# Patient Record
Sex: MALE | Race: WHITE | Employment: UNEMPLOYED | URBAN - METROPOLITAN AREA
[De-identification: names, ages, dates, MRNs, and addresses within clinical notes are randomized per-mention and may not be internally consistent; named-entity substitution may affect disease eponyms.]

---

## 2017-08-07 ENCOUNTER — APPOINTMENT (OUTPATIENT)
Dept: GENERAL RADIOLOGY | Age: 27
End: 2017-08-07
Attending: PHYSICIAN ASSISTANT
Payer: SELF-PAY

## 2017-08-07 ENCOUNTER — APPOINTMENT (OUTPATIENT)
Dept: CT IMAGING | Age: 27
End: 2017-08-07
Attending: NURSE PRACTITIONER
Payer: SELF-PAY

## 2017-08-07 ENCOUNTER — HOSPITAL ENCOUNTER (EMERGENCY)
Age: 27
Discharge: ANOTHER HOSPITAL | End: 2017-08-07
Attending: EMERGENCY MEDICINE | Admitting: EMERGENCY MEDICINE
Payer: SELF-PAY

## 2017-08-07 ENCOUNTER — APPOINTMENT (OUTPATIENT)
Dept: MRI IMAGING | Age: 27
End: 2017-08-07
Attending: PHYSICIAN ASSISTANT
Payer: SELF-PAY

## 2017-08-07 VITALS
HEIGHT: 61 IN | TEMPERATURE: 98.6 F | DIASTOLIC BLOOD PRESSURE: 96 MMHG | HEART RATE: 64 BPM | BODY MASS INDEX: 34.93 KG/M2 | OXYGEN SATURATION: 97 % | SYSTOLIC BLOOD PRESSURE: 112 MMHG | RESPIRATION RATE: 16 BRPM | WEIGHT: 185 LBS

## 2017-08-07 DIAGNOSIS — Q05.5: Primary | ICD-10-CM

## 2017-08-07 LAB
ANION GAP BLD CALC-SCNC: 7 MMOL/L (ref 5–15)
APPEARANCE UR: CLEAR
BACTERIA URNS QL MICRO: NEGATIVE /HPF
BASOPHILS # BLD AUTO: 0 K/UL (ref 0–0.1)
BASOPHILS # BLD: 1 % (ref 0–1)
BILIRUB UR QL: NEGATIVE
BUN SERPL-MCNC: 11 MG/DL (ref 6–20)
BUN/CREAT SERPL: 18 (ref 12–20)
CALCIUM SERPL-MCNC: 9.6 MG/DL (ref 8.5–10.1)
CHLORIDE SERPL-SCNC: 103 MMOL/L (ref 97–108)
CO2 SERPL-SCNC: 28 MMOL/L (ref 21–32)
COLOR UR: NORMAL
CREAT SERPL-MCNC: 0.61 MG/DL (ref 0.7–1.3)
EOSINOPHIL # BLD: 0.1 K/UL (ref 0–0.4)
EOSINOPHIL NFR BLD: 2 % (ref 0–7)
EPITH CASTS URNS QL MICRO: NORMAL /LPF
ERYTHROCYTE [DISTWIDTH] IN BLOOD BY AUTOMATED COUNT: 12.8 % (ref 11.5–14.5)
GLUCOSE SERPL-MCNC: 97 MG/DL (ref 65–100)
GLUCOSE UR STRIP.AUTO-MCNC: NEGATIVE MG/DL
HCT VFR BLD AUTO: 43.4 % (ref 36.6–50.3)
HGB BLD-MCNC: 15.1 G/DL (ref 12.1–17)
HGB UR QL STRIP: NEGATIVE
HYALINE CASTS URNS QL MICRO: NORMAL /LPF (ref 0–5)
KETONES UR QL STRIP.AUTO: NEGATIVE MG/DL
LEUKOCYTE ESTERASE UR QL STRIP.AUTO: NEGATIVE
LYMPHOCYTES # BLD AUTO: 28 % (ref 12–49)
LYMPHOCYTES # BLD: 2 K/UL (ref 0.8–3.5)
MAGNESIUM SERPL-MCNC: 1.9 MG/DL (ref 1.6–2.4)
MCH RBC QN AUTO: 31.2 PG (ref 26–34)
MCHC RBC AUTO-ENTMCNC: 34.8 G/DL (ref 30–36.5)
MCV RBC AUTO: 89.7 FL (ref 80–99)
MONOCYTES # BLD: 0.6 K/UL (ref 0–1)
MONOCYTES NFR BLD AUTO: 9 % (ref 5–13)
NEUTS SEG # BLD: 4.3 K/UL (ref 1.8–8)
NEUTS SEG NFR BLD AUTO: 60 % (ref 32–75)
NITRITE UR QL STRIP.AUTO: NEGATIVE
PH UR STRIP: 7 [PH] (ref 5–8)
PLATELET # BLD AUTO: 198 K/UL (ref 150–400)
POTASSIUM SERPL-SCNC: 5.3 MMOL/L (ref 3.5–5.1)
PROT UR STRIP-MCNC: NEGATIVE MG/DL
RBC # BLD AUTO: 4.84 M/UL (ref 4.1–5.7)
RBC #/AREA URNS HPF: NORMAL /HPF (ref 0–5)
SODIUM SERPL-SCNC: 138 MMOL/L (ref 136–145)
SP GR UR REFRACTOMETRY: 1.01 (ref 1–1.03)
UROBILINOGEN UR QL STRIP.AUTO: 0.2 EU/DL (ref 0.2–1)
WBC # BLD AUTO: 7.1 K/UL (ref 4.1–11.1)
WBC URNS QL MICRO: NORMAL /HPF (ref 0–4)

## 2017-08-07 PROCEDURE — 74011250636 HC RX REV CODE- 250/636: Performed by: EMERGENCY MEDICINE

## 2017-08-07 PROCEDURE — 72141 MRI NECK SPINE W/O DYE: CPT

## 2017-08-07 PROCEDURE — 96374 THER/PROPH/DIAG INJ IV PUSH: CPT

## 2017-08-07 PROCEDURE — 72146 MRI CHEST SPINE W/O DYE: CPT

## 2017-08-07 PROCEDURE — 72125 CT NECK SPINE W/O DYE: CPT

## 2017-08-07 PROCEDURE — 72072 X-RAY EXAM THORAC SPINE 3VWS: CPT

## 2017-08-07 PROCEDURE — 93971 EXTREMITY STUDY: CPT

## 2017-08-07 PROCEDURE — 81001 URINALYSIS AUTO W/SCOPE: CPT | Performed by: NURSE PRACTITIONER

## 2017-08-07 PROCEDURE — 99285 EMERGENCY DEPT VISIT HI MDM: CPT

## 2017-08-07 PROCEDURE — 70450 CT HEAD/BRAIN W/O DYE: CPT

## 2017-08-07 PROCEDURE — 85025 COMPLETE CBC W/AUTO DIFF WBC: CPT | Performed by: NURSE PRACTITIONER

## 2017-08-07 PROCEDURE — 83735 ASSAY OF MAGNESIUM: CPT | Performed by: NURSE PRACTITIONER

## 2017-08-07 PROCEDURE — 74011250637 HC RX REV CODE- 250/637: Performed by: NURSE PRACTITIONER

## 2017-08-07 PROCEDURE — 80048 BASIC METABOLIC PNL TOTAL CA: CPT | Performed by: NURSE PRACTITIONER

## 2017-08-07 PROCEDURE — 36415 COLL VENOUS BLD VENIPUNCTURE: CPT | Performed by: NURSE PRACTITIONER

## 2017-08-07 RX ORDER — OXYCODONE HYDROCHLORIDE 5 MG/1
5 TABLET ORAL
Status: COMPLETED | OUTPATIENT
Start: 2017-08-07 | End: 2017-08-07

## 2017-08-07 RX ORDER — HYDROMORPHONE HYDROCHLORIDE 1 MG/ML
1 INJECTION, SOLUTION INTRAMUSCULAR; INTRAVENOUS; SUBCUTANEOUS ONCE
Status: COMPLETED | OUTPATIENT
Start: 2017-08-07 | End: 2017-08-07

## 2017-08-07 RX ORDER — IBUPROFEN 200 MG
1 TABLET ORAL ONCE
Status: DISCONTINUED | OUTPATIENT
Start: 2017-08-07 | End: 2017-08-08 | Stop reason: HOSPADM

## 2017-08-07 RX ADMIN — HYDROMORPHONE HYDROCHLORIDE 1 MG: 1 INJECTION, SOLUTION INTRAMUSCULAR; INTRAVENOUS; SUBCUTANEOUS at 19:45

## 2017-08-07 RX ADMIN — OXYCODONE HYDROCHLORIDE 5 MG: 5 TABLET ORAL at 17:21

## 2017-08-07 NOTE — CONSULTS
ORTHOPEDIC  CONSULT    Subjective:     Date of Consultation:  2017    Referring Physician:  Melania Perdomo NP    Kailyn Guardado is a 32 y.o. male who is being seen for increasing pain, numbness, and swelling in his right arm and right leg. He is an otherwise healthy patient that presented to the [facility] complaining of a 4 month history of increasing weakness and numbness in his arms and legs. This has progressively gotten worse without injury. The patient states he has not had an injury,but states he has had a workup in the past for a meningocele. He does not remember when or what images he has had done in the past.  He states he went to [place] [facility] about a month ago and was diagnosed with a muscle strain. He denies fever, chills, nausea, or vomiting. There are no active problems to display for this patient. No family history on file. Social History   Substance Use Topics    Smoking status: Not on file    Smokeless tobacco: Not on file    Alcohol use Not on file     No past medical history on file. No past surgical history on file. Prior to Admission medications    Not on File     Current Facility-Administered Medications   Medication Dose Route Frequency    nicotine (NICODERM CQ) 21 mg/24 hr patch 1 Patch  1 Patch TransDERmal ONCE     No current outpatient prescriptions on file. Allergies   Allergen Reactions    Morphine Nausea and Vomiting        Review of Systems:  Pertinent items are noted in HPI. Objective:     Patient Vitals for the past 8 hrs:   BP Temp Pulse Resp SpO2 Height Weight   17 1722 - - - - 95 % - -   17 1721 135/72 - - - - - -   17 1404 134/70 - 64 16 95 % - -   17 1000 117/70 98.6 °F (37 °C) 71 18 97 % 5' 1\" (1.549 m) 83.9 kg (185 lb)     Temp (24hrs), Av.6 °F (37 °C), Min:98.6 °F (37 °C), Max:98.6 °F (37 °C)        EXAM: GEN: Well developed and well nurtured.     PSYCH:  AAO x 3  MUSC: There is no erythema or ecchymosis of his bilateral upper extremities. He has diffuse edema of his right upper extremity from finger to mid forearm. He has decreased  strength on the right- 4/5. Right bicep is 4/5. Right tricep is 4/5. DTR of biceps and triceps is hyperreflexic on the right and normal 2+ on the left. He has hyperkyphosis of his cervical and cervical thoracic junction. He has obvious muscle wasting of the right scapula. He has obvious scoliotic curve from thoracic spine to cervical spine. There is loss of normal lordosis of lumber spine. Right leg has mild edema. There is weakness with PF and DF of right foot. The right PF is 4/5. DTR of achilles is hyporeflexic on the right and normal on the left. FINDINGS:     There is straightening of the normal cervical lordosis with complex segmentation  anomaly at the cervical thoracic junction. Numbering of the lower cervical and  upper thoracic spine is extremely difficult. There is a defect in the anterior  vertebral column beginning at approximately T5 inferior endplate. Through this  defect there is a large complex cystic lesion which has both components within  the canal and anterior to the vertebral body. The component within the canal  measures 4.7 x 2.3 x 2.9 cm. This extends anterior to the upper thoracic  vertebral body into the anterior mediastinum to the right and this portion  measures 5.4 x 4.5 x 1.9 cm. There are septations within this collection. This  lesion differs in signal from the adjacent CSF being isointense on T1 and  hyperintense on the T2 and STIR images and may contain proteinaceous material.  This results in severe compression of the cord beginning at the mid body of C4  through the inferior margin of the mass lesion and bony defect within the canal.  There is focal syrinx within the cord and slightly extending into the upper  thoracic spine.  The cord in the remainder spine has a small syrinx/prominence of  the central medullary canal measuring 1.6 mm. As numbering of the thoracic spine  is challenging exact termination of the cord is difficult to determine however  it does not appear to be low-lying.     There is no significant degenerative change of the cervical spine.     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 There is a complex cystic lesion  (may represent a narrowing characteristic cyst) with both epidural and  extraspinal components. This results in severe compression of the cord extending  from approximately C4 through the inferior margin of the bony defect with  associated syrinx    FINDINGS:     There is straightening of the normal cervical lordosis with complex segmentation  anomaly at the cervical thoracic junction. Numbering of the lower cervical and  upper thoracic spine is extremely difficult. There is a defect in the anterior  vertebral column beginning at approximately T5 inferior endplate. Through this  defect there is a large complex cystic lesion which has both components within  the canal and anterior to the vertebral body. The component within the canal  measures 4.7 x 2.3 x 2.9 cm. This extends anterior to the upper thoracic  vertebral body into the anterior mediastinum to the right and this portion  measures 5.4 x 4.5 x 1.9 cm. There are septations within this collection. This  lesion differs in signal from the adjacent CSF being isointense on T1 and  hyperintense on the T2 and STIR images and may contain proteinaceous material.  This results in severe compression of the cord beginning at the mid body of C4  through the inferior margin of the mass lesion and bony defect within the canal.  There is focal syrinx within the cord and slightly extending into the upper  thoracic spine. The cord in the remainder spine has a small syrinx/prominence of  the central medullary canal measuring 1.6 mm.  As numbering of the thoracic spine  is challenging exact termination of the cord is difficult to determine however  it does not appear to be low-lying.     There is no significant degenerative change of the cervical spine.     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 There is a complex cystic lesion  (may represent a narrowing characteristic cyst) with both epidural and  extraspinal components. This results in severe compression of the cord extending  from approximately C4 through the inferior margin of the bony defect with  associated syrinx    Data Review   Recent Results (from the past 24 hour(s))   CBC WITH AUTOMATED DIFF    Collection Time: 08/07/17 11:11 AM   Result Value Ref Range    WBC 7.1 4.1 - 11.1 K/uL    RBC 4.84 4.10 - 5.70 M/uL    HGB 15.1 12.1 - 17.0 g/dL    HCT 43.4 36.6 - 50.3 %    MCV 89.7 80.0 - 99.0 FL    MCH 31.2 26.0 - 34.0 PG    MCHC 34.8 30.0 - 36.5 g/dL    RDW 12.8 11.5 - 14.5 %    PLATELET 447 423 - 178 K/uL    NEUTROPHILS 60 32 - 75 %    LYMPHOCYTES 28 12 - 49 %    MONOCYTES 9 5 - 13 %    EOSINOPHILS 2 0 - 7 %    BASOPHILS 1 0 - 1 %    ABS. NEUTROPHILS 4.3 1.8 - 8.0 K/UL    ABS. LYMPHOCYTES 2.0 0.8 - 3.5 K/UL    ABS. MONOCYTES 0.6 0.0 - 1.0 K/UL    ABS. EOSINOPHILS 0.1 0.0 - 0.4 K/UL    ABS.  BASOPHILS 0.0 0.0 - 0.1 K/UL   METABOLIC PANEL, BASIC    Collection Time: 08/07/17 11:11 AM   Result Value Ref Range    Sodium 138 136 - 145 mmol/L    Potassium 5.3 (H) 3.5 - 5.1 mmol/L    Chloride 103 97 - 108 mmol/L    CO2 28 21 - 32 mmol/L    Anion gap 7 5 - 15 mmol/L    Glucose 97 65 - 100 mg/dL    BUN 11 6 - 20 MG/DL    Creatinine 0.61 (L) 0.70 - 1.30 MG/DL    BUN/Creatinine ratio 18 12 - 20      GFR est AA >60 >60 ml/min/1.73m2    GFR est non-AA >60 >60 ml/min/1.73m2    Calcium 9.6 8.5 - 10.1 MG/DL   MAGNESIUM    Collection Time: 08/07/17 11:11 AM   Result Value Ref Range    Magnesium 1.9 1.6 - 2.4 mg/dL URINALYSIS W/MICROSCOPIC    Collection Time: 08/07/17 11:47 AM   Result Value Ref Range    Color YELLOW/STRAW      Appearance CLEAR CLEAR      Specific gravity 1.011 1.003 - 1.030      pH (UA) 7.0 5.0 - 8.0      Protein NEGATIVE  NEG mg/dL    Glucose NEGATIVE  NEG mg/dL    Ketone NEGATIVE  NEG mg/dL    Bilirubin NEGATIVE  NEG      Blood NEGATIVE  NEG      Urobilinogen 0.2 0.2 - 1.0 EU/dL    Nitrites NEGATIVE  NEG      Leukocyte Esterase NEGATIVE  NEG      WBC 0-4 0 - 4 /hpf    RBC 0-5 0 - 5 /hpf    Epithelial cells FEW FEW /lpf    Bacteria NEGATIVE  NEG /hpf    Hyaline cast 0-2 0 - 5 /lpf         Assessment/Plan:     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 Marylen Highman about the patient who had their spine surgeon review the films as well. They both agree this patient needs to be transferred to [facility] neurosurgical unit for definitive management. We will set up this transfer tonight. He will be placed in a cervical hard collar to be transferred. The above was discussed with the patient in detail and he understands the severity of this case. He refused hard cervical collar for transport. Discussed case with Dr. Marylen Highman who agrees with plan. Fran Goldmann, PA   [facility]  [address] [place] [address]  Pgr.  [phone]

## 2017-08-07 NOTE — ED NOTES
Entered pt room to apply Nicotine patch to find pt not in room, significant other not in room, and no belongings in room.

## 2017-08-07 NOTE — ED TRIAGE NOTES
Pt states having neck pain, spasms x 4 months, right side pain that radiates down to wrist of right arm. Numbness to left side of neck and left arm and leg.

## 2017-08-07 NOTE — PROCEDURES
[facility]  *** FINAL REPORT ***    Name: Carolina Núñez  MRN: [id number]    Outpatient  : 1990  HIS Order #: [id number]  [address] [facility] Visit #: [id number]  Date: 07 Aug 2017    TYPE OF TEST: Peripheral Venous Testing    REASON FOR TEST  Pain in limb    Right Leg:-  Deep venous thrombosis:           No  Superficial venous thrombosis:    No  Deep venous insufficiency:        No  Superficial venous insufficiency: No      INTERPRETATION/FINDINGS  PROCEDURE:  RIGHT LOWER EXTREMITY  VENOUS DUPLEX. Evaluation of lower  extremity veins with ultrasound (B-mode imaging, pulsed Doppler, color   Doppler). Includes the common femoral, deep femoral, femoral,  popliteal, posterior tibial, peroneal, and great saphenous veins. Other veins, for example the gastrocnemius and soleal veins, may also  be visualized. FINDINGS: Salas Arun scale and color flow duplex images of the veins in the  right lower extremity demonstrate normal compressibility, spontaneous  and augmented flow profiles, and absence of filling defects throughout   the deep and superficial veins in the right lower extremity. CONCLUSION: Right lower extremity venous duplex negative for deep  venous thrombosis or thrombophlebitis. Left common femoral vein is  thrombus free. ADDITIONAL COMMENTS    I have personally reviewed the data relevant to the interpretation of  this  study. TECHNOLOGIST: Terrence Franco RDCS  Signed: 2017 10:54 AM    PHYSICIAN: Ahmet Alvarez.  Jake Rogers MD  Signed: 2017 07:41 AM

## 2017-08-07 NOTE — ED PROVIDER NOTES
HPI Comments: Rashad Sloan is a 32 y.o. male who presents ambulatory with his significant other to the ED with  c/o right hand numbness and tingling, right calf pain, right thigh numbness and left arm and leg numbness and tingling. Patient states these symptoms started about four months ago and have gotten increasingly worse. Patient states he went to PCP along with [facility] and was given anti- inflammatory medicine only and was told\" it would improve. \" Patient presents here today because he said \"it hasn't improved and the numbness and tingling are getting worse. \" Patient states pain 5/10 currently in neck. PCP: Modesto Dennis MD    PMHx significant for: No past medical history on file. PSHx significant for: No past surgical history on file. Social Hx: Tobacco: current everyday user EtOH: none Illicit drug use: none    There are no further complaints or symptoms at this time. The history is provided by the patient and a significant other. No past medical history on file. No past surgical history on file. No family history on file. Social History     Social History    Marital status: LEGALLY      Spouse name: N/A    Number of children: N/A    Years of education: N/A     Occupational History    Not on file. Social History Main Topics    Smoking status: Not on file    Smokeless tobacco: Not on file    Alcohol use Not on file    Drug use: Not on file    Sexual activity: Not on file     Other Topics Concern    Not on file     Social History Narrative         ALLERGIES: Morphine    Review of Systems   Constitutional: Positive for activity change (states he has been sedentary due to weakness, numbness and tingling). Negative for appetite change, chills, diaphoresis, fatigue and fever. HENT: Negative for congestion, ear pain, sinus pressure, sore throat, trouble swallowing and voice change.     Eyes: Negative for photophobia, pain, redness and visual disturbance. Respiratory: Negative for chest tightness, shortness of breath and wheezing. Cardiovascular: Negative for chest pain and palpitations. Gastrointestinal: Negative for abdominal distention, abdominal pain, nausea and vomiting. Endocrine: Negative. Genitourinary: Negative for difficulty urinating, flank pain, frequency and urgency. Musculoskeletal: Positive for neck pain (cervical spine tenderness on palpation) and neck stiffness (states neck stiff for about four months). Negative for gait problem and joint swelling. Skin: Negative for color change, pallor, rash and wound. Allergic/Immunologic: Negative. Neurological: Positive for weakness (right hand weakness noted, left leg weakness) and numbness (to bilateral upper and lower extremities). Negative for dizziness, syncope, speech difficulty and headaches. Hematological: Does not bruise/bleed easily. Psychiatric/Behavioral: Negative for behavioral problems. The patient is not nervous/anxious. Vitals:    08/07/17 1000 08/07/17 1404   BP: 117/70 134/70   Pulse: 71 64   Resp: 18 16   Temp: 98.6 °F (37 °C)    SpO2: 97% 95%   Weight: 83.9 kg (185 lb)    Height: 5' 1\" (1.549 m)             Physical Exam   Constitutional: He is oriented to person, place, and time. He appears well-developed and well-nourished. He appears distressed (moderate distress). HENT:   Head: Normocephalic and atraumatic. Right Ear: External ear normal.   Left Ear: External ear normal.   Nose: Nose normal.   Mouth/Throat: Oropharynx is clear and moist.   Eyes: Conjunctivae and EOM are normal. Pupils are equal, round, and reactive to light. Right eye exhibits no discharge. Left eye exhibits no discharge. Neck: Neck supple. No JVD present. No tracheal deviation present. Unable to perform full range of motion. Patient states he can not range to the left, minimally to right.  Flexion and extension minimal.  Refuses cervical collar   Cardiovascular: Normal rate, regular rhythm, normal heart sounds and intact distal pulses. Exam reveals no gallop. No murmur heard. Pulmonary/Chest: Effort normal and breath sounds normal. No respiratory distress. He has no wheezes. He has no rales. He exhibits no tenderness. Abdominal: Soft. Bowel sounds are normal. He exhibits no distension. There is no tenderness. There is no rebound and no guarding. Genitourinary:   Genitourinary Comments: Negative; positive sensation to both bowel and bladder   Musculoskeletal: He exhibits no edema or tenderness. C/o weakness to right hand and left leg. Lymphadenopathy:     He has no cervical adenopathy. Neurological: He is alert and oriented to person, place, and time. c/o right hand numbness and tingling, right calf pain, right thigh numbness and left arm and leg numbness and tingling. Skin: Skin is warm and dry. No rash noted. No erythema. No pallor. Psychiatric: He has a normal mood and affect. His behavior is normal. Judgment and thought content normal.   Nursing note and vitals reviewed. MDM  Number of Diagnoses or Management Options  Cervical spinal meningocele [facility]): new and requires workup  Diagnosis management comments: Plan:  Transfer to [facility] Department of Neurosurgery  Dr. Rachel Higgins accepted patient under Nsgy service    ED Course     1450: Orthopedics at bedside to see patient. For MRI cervical and thoracic spine today. 1515: AT MRI for films. 1551: re assessment and discussion with patient regarding the plan of care. Diet ordered  1615: To MRI for cervical and thoracic scans  1700: reviewed MRI with Loli Thomaser and Radiologist Dr. Monique Gao. Probable transfer to [facility] for complex neurosurgical intervention. 1800: attempted to place cervical collar on patient. Patient refuses. Explained need and patient refuses. 1810: Dr. Rachel Higgins accepted patient transfer to [facility], Department of Neurosurgery. EMTALA form complete.  Awaiting room assignment and transport.     Procedures

## 2017-08-07 NOTE — ED NOTES
Spoke to Derick Seip PA states patient needs hard collar and will receive aspen collar after surgery. Applied collar to patient states it is uncomfortable, readjusted by this nurse. Patient then takes off collar states thanks for trying but no I am not wearing that.  PA aware and RN aware

## 2017-08-07 NOTE — ED NOTES
Introduced self to patient as primary nurse and assumed care. Plan of care and timeline explained. Pt made aware that it is their responsibility to excuse family or visitors if they do not want their medical information discussed.

## 2017-08-07 NOTE — ED NOTES
Informed pt on waiting for transport and admitting bed assignment from [facility].

## 2017-08-07 NOTE — ED NOTES
Bedside and Verbal shift change report given to Aravind Gomez (oncoming nurse) by Pily Mijares (offgoing nurse). Report included the following information SBAR, Kardex and ED Summary.

## 2017-08-08 NOTE — ED NOTES
[facility] transport arrived, given report to [facility] and [facility] ED. Facesheet, MRI disc, copy of ED chart and test and EMTALA formed sent with [facility] to give to [facility].

## 2017-08-08 NOTE — ED NOTES
8:53 PM  [facility] doesn't have a bed available for pt tonight. Will talk to ED to get a bed there so pt can be seen by neurosurgery tonight.     8:57 PM  [facility] Emergency Physician accepts pt to ED at [facility] Dr Marylene Gallo

## 2017-08-08 NOTE — ED NOTES
Transfer center called and states that there are no neurosurgeon beds available at this time and could get in maybe in the morning; informed Dr Denis Harmon of information, to transfer to [address] [place] [address] ED.